# Patient Record
Sex: FEMALE | Race: WHITE | NOT HISPANIC OR LATINO | Employment: FULL TIME | RURAL
[De-identification: names, ages, dates, MRNs, and addresses within clinical notes are randomized per-mention and may not be internally consistent; named-entity substitution may affect disease eponyms.]

---

## 2024-10-03 ENCOUNTER — OFFICE VISIT (OUTPATIENT)
Dept: FAMILY MEDICINE | Facility: CLINIC | Age: 54
End: 2024-10-03
Payer: COMMERCIAL

## 2024-10-03 VITALS
RESPIRATION RATE: 20 BRPM | TEMPERATURE: 99 F | SYSTOLIC BLOOD PRESSURE: 108 MMHG | HEIGHT: 67 IN | OXYGEN SATURATION: 98 % | DIASTOLIC BLOOD PRESSURE: 73 MMHG | HEART RATE: 66 BPM | WEIGHT: 139 LBS | BODY MASS INDEX: 21.82 KG/M2

## 2024-10-03 DIAGNOSIS — H66.002 NON-RECURRENT ACUTE SUPPURATIVE OTITIS MEDIA OF LEFT EAR WITHOUT SPONTANEOUS RUPTURE OF TYMPANIC MEMBRANE: Primary | ICD-10-CM

## 2024-10-03 PROBLEM — Z78.0 POSTMENOPAUSAL: Status: ACTIVE | Noted: 2024-09-06

## 2024-10-03 PROBLEM — B00.9 HERPES SIMPLEX: Status: ACTIVE | Noted: 2024-10-03

## 2024-10-03 PROBLEM — E78.5 HYPERLIPIDEMIA: Status: ACTIVE | Noted: 2019-07-30

## 2024-10-03 PROBLEM — C18.7 ADENOCARCINOMA OF SIGMOID COLON: Chronic | Status: ACTIVE | Noted: 2021-10-27

## 2024-10-03 PROCEDURE — 99203 OFFICE O/P NEW LOW 30 MIN: CPT | Mod: 25,,, | Performed by: NURSE PRACTITIONER

## 2024-10-03 PROCEDURE — 96372 THER/PROPH/DIAG INJ SC/IM: CPT | Mod: ,,, | Performed by: NURSE PRACTITIONER

## 2024-10-03 RX ORDER — ASCORBIC ACID 250 MG
250 TABLET ORAL DAILY
COMMUNITY

## 2024-10-03 RX ORDER — GUAIFENESIN 1200 MG
200 TABLET, EXTENDED RELEASE 12 HR ORAL DAILY PRN
COMMUNITY

## 2024-10-03 RX ORDER — CEFTRIAXONE 1 G/1
1 INJECTION, POWDER, FOR SOLUTION INTRAMUSCULAR; INTRAVENOUS
Status: COMPLETED | OUTPATIENT
Start: 2024-10-03 | End: 2024-10-03

## 2024-10-03 RX ORDER — AMOXICILLIN 500 MG/1
500 TABLET, FILM COATED ORAL EVERY 12 HOURS
Qty: 20 TABLET | Refills: 0 | Status: SHIPPED | OUTPATIENT
Start: 2024-10-03 | End: 2024-10-13

## 2024-10-03 RX ORDER — PERPHENAZINE 4 MG
10 TABLET ORAL DAILY
COMMUNITY

## 2024-10-03 RX ADMIN — CEFTRIAXONE 1 G: 1 INJECTION, POWDER, FOR SOLUTION INTRAMUSCULAR; INTRAVENOUS at 02:10

## 2024-10-03 NOTE — PROGRESS NOTES
ANDERSON Chaney        PATIENT NAME: Narinder Ly  : 1970  DATE: 10/3/24  MRN: [id number]      Patient PCP Information       Provider PCP Type    Primary Doctor No General            Reason for Visit / Chief Complaint: Otalgia (Pt presents to the clinic for ear infection she did a tele health yesterday was prescribed ear drops )       Update PCP  Update Chief Complaint         History of Present Illness / Problem Focused Workflow     Narinder Ly presents to the clinic with Otalgia (Pt presents to the clinic for ear infection she did a tele health yesterday was prescribed ear drops )     HPI    Patient presents to clinic with left ear pain. Patient did telehealth recently which prescribed gtts and no improvement noted. Patient scheduled to fly out for trip this weekend.      Medical / Social / Family History   History reviewed. No pertinent past medical history.    History reviewed. No pertinent surgical history.    Social History  Ms.  reports that she has never smoked. She has never been exposed to tobacco smoke. She has never used smokeless tobacco.    Family History  Ms.'s family history is not on file.    Medications and Allergies     Medications  Outpatient Medications Marked as Taking for the 10/3/24 encounter (Office Visit) with Tuyet Lira FNP   Medication Sig Dispense Refill    acetaminophen 325 mg Cap Take 200 mg by mouth daily as needed (.).      ascorbic acid, vitamin C, (VITAMIN C) 250 MG tablet Take 250 mg by mouth once daily.      collagen-biotin-ascorbic acid (COLLAGEN 1500 PLUS C) 500 mg-800 mcg- 50 mg Cap Take 10 g by mouth once daily.      multivit with minerals/lutein (MULTIVITAMIN 50 PLUS ORAL) Take 1 capsule by mouth once daily.       Current Facility-Administered Medications for the 10/3/24 encounter (Office Visit) with Tuyet Lira FNP   Medication Dose Route Frequency Provider Last Rate Last Admin    cefTRIAXone injection 1 g  1 g Intramuscular 1 time in  "Clinic/HOD Tuyet Lira P, FNP           Allergies  Review of patient's allergies indicates:   Allergen Reactions    Gentamicin Swelling and Hives     Other Reaction(s): Unknown    Per pt "I got an ear infection and ended up in the hospital"      Per pt "I got an ear infection and ended up in the hospital" Per pt "I got an ear infection and ended up in the hospital"    Per pt "I got an ear infection and ended up in the hospital"    Per pt "I got an ear infection and ended up in the hospital"   Per pt "I got an ear infection and ended up in the hospital"    Tissue glues Rash and Swelling     Dermabond surgical glue    2-octyl cyanoacrylate Rash     Dermabond-Large rash.    Adhesive Rash     primapore       Physical Examination     Vitals:    10/03/24 1331   BP: 108/73   BP Location: Right arm   Patient Position: Sitting   Pulse: 66   Resp: 20   Temp: 98.8 °F (37.1 °C)   TempSrc: Oral   SpO2: 98%   Weight: 63 kg (139 lb)   Height: 5' 7" (1.702 m)       Physical Exam  Vitals reviewed.   Constitutional:       Appearance: Normal appearance.   HENT:      Head: Normocephalic.      Right Ear: Tympanic membrane, ear canal and external ear normal.      Ears:      Comments: Left ear canal swollen difficult to assess TM. Erythema noted to canal     Nose: Nose normal.      Mouth/Throat:      Mouth: Mucous membranes are moist.   Eyes:      Extraocular Movements: Extraocular movements intact.   Cardiovascular:      Rate and Rhythm: Normal rate and regular rhythm.      Pulses: Normal pulses.      Heart sounds: Normal heart sounds.   Pulmonary:      Effort: Pulmonary effort is normal.   Musculoskeletal:         General: Normal range of motion.      Cervical back: Normal range of motion.   Skin:     General: Skin is warm and dry.      Capillary Refill: Capillary refill takes less than 2 seconds.   Neurological:      General: No focal deficit present.      Mental Status: She is alert and oriented to person, place, and time. "   Psychiatric:         Mood and Affect: Mood normal.         Behavior: Behavior normal.         Thought Content: Thought content normal.         Judgment: Judgment normal.           No results found for any previous visit.             Assessment and Plan (including Health Maintenance)      Problem List  Smart Sets  Document Outside HM   :    Plan:     1. Non-recurrent acute suppurative otitis media of left ear without spontaneous rupture of tympanic membrane  -     amoxicillin (AMOXIL) 500 MG Tab; Take 1 tablet (500 mg total) by mouth every 12 (twelve) hours. for 10 days  Dispense: 20 tablet; Refill: 0  -     cefTRIAXone injection 1 g    RTC prn     There are no Patient Instructions on file for this visit.       Health Maintenance Due   Topic Date Due    Hepatitis C Screening  Never done    Lipid Panel  Never done    Colorectal Cancer Screening  Never done    TETANUS VACCINE  08/18/2024    Influenza Vaccine (1) 09/01/2024    COVID-19 Vaccine (3 - 2024-25 season) 09/01/2024       Most Recent Immunizations   Administered Date(s) Administered    COVID-19, MRNA, LN-S, PF (Pfizer) (Purple Cap) 04/07/2021            Health Maintenance Topics with due status: Not Due       Topic Last Completion Date    RSV Vaccine (Age 60+ and Pregnant patients) Not Due       Future Appointments   Date Time Provider Department Center   10/3/2024  2:45 PM Tuyet Lira FNP [facility]            Signature:  ANDERSON Chaney  [facility]     Date of encounter: 10/3/24